# Patient Record
Sex: MALE | URBAN - METROPOLITAN AREA
[De-identification: names, ages, dates, MRNs, and addresses within clinical notes are randomized per-mention and may not be internally consistent; named-entity substitution may affect disease eponyms.]

---

## 2020-08-10 ENCOUNTER — APPOINTMENT (OUTPATIENT)
Dept: URBAN - METROPOLITAN AREA CLINIC 237 | Age: 20
Setting detail: DERMATOLOGY
End: 2020-08-10

## 2020-08-10 VITALS — TEMPERATURE: 97.7 F

## 2020-08-10 DIAGNOSIS — L72.0 EPIDERMAL CYST: ICD-10-CM

## 2020-08-10 DIAGNOSIS — L65.9 NONSCARRING HAIR LOSS, UNSPECIFIED: ICD-10-CM

## 2020-08-10 PROCEDURE — OTHER MEDICATION COUNSELING: OTHER

## 2020-08-10 PROCEDURE — OTHER DIAGNOSIS COMMENT: OTHER

## 2020-08-10 PROCEDURE — OTHER MIPS QUALITY: OTHER

## 2020-08-10 PROCEDURE — OTHER REASSURANCE: OTHER

## 2020-08-10 PROCEDURE — 99202 OFFICE O/P NEW SF 15 MIN: CPT

## 2020-08-10 PROCEDURE — OTHER PATIENT SPECIFIC COUNSELING: OTHER

## 2020-08-10 PROCEDURE — OTHER ADDITIONAL NOTES: OTHER

## 2020-08-10 PROCEDURE — OTHER TREATMENT REGIMEN: OTHER

## 2020-08-10 PROCEDURE — OTHER COUNSELING: OTHER

## 2020-08-10 ASSESSMENT — LOCATION SIMPLE DESCRIPTION DERM
LOCATION SIMPLE: RIGHT CHEEK
LOCATION SIMPLE: RIGHT FOREHEAD
LOCATION SIMPLE: LEFT FOREHEAD
LOCATION SIMPLE: LEFT CHEEK

## 2020-08-10 ASSESSMENT — LOCATION DETAILED DESCRIPTION DERM
LOCATION DETAILED: RIGHT SUPERIOR FOREHEAD
LOCATION DETAILED: LEFT SUPERIOR CENTRAL MALAR CHEEK
LOCATION DETAILED: RIGHT SUPERIOR CENTRAL MALAR CHEEK
LOCATION DETAILED: LEFT SUPERIOR FOREHEAD

## 2020-08-10 ASSESSMENT — LOCATION ZONE DERM: LOCATION ZONE: FACE

## 2020-08-10 NOTE — PROCEDURE: TREATMENT REGIMEN
Detail Level: Zone
Initiate Treatment: Differin gel 0.01% 2-3x a week working up to nightly use (avoid eyelids)

## 2020-08-10 NOTE — PROCEDURE: MEDICATION COUNSELING
Oxybutynin Pregnancy And Lactation Text: This medication is Pregnancy Category B and is considered safe during pregnancy. It is unknown if it is excreted in breast milk. no

## 2020-08-10 NOTE — PROCEDURE: MEDICATION COUNSELING
Xeldezz Pregnancy And Lactation Text: This medication is Pregnancy Category D and is not considered safe during pregnancy.  The risk during breast feeding is also uncertain.

## 2020-08-10 NOTE — PROCEDURE: ADDITIONAL NOTES
Additional Notes: Discussed cosmetic extraction. Quoted $200. Discussed that lesions near or on eyelid would be referred out. Discussed risk of recurrence, swelling, scarring, bruising.
Detail Level: Zone